# Patient Record
Sex: MALE | Race: WHITE | Employment: OTHER | URBAN - NONMETROPOLITAN AREA
[De-identification: names, ages, dates, MRNs, and addresses within clinical notes are randomized per-mention and may not be internally consistent; named-entity substitution may affect disease eponyms.]

---

## 2021-11-04 PROBLEM — H26.40 SECONDARY CATARACT: Status: ACTIVE | Noted: 2018-12-12

## 2021-11-04 PROBLEM — H32 HISTOPLASMOSIS RETINITIS: Status: ACTIVE | Noted: 2017-02-14

## 2021-11-04 PROBLEM — B39.9 HISTOPLASMOSIS RETINITIS: Status: ACTIVE | Noted: 2017-02-14

## 2021-11-04 PROBLEM — K58.9 IRRITABLE BOWEL SYNDROME: Status: ACTIVE | Noted: 2021-05-28

## 2021-11-04 PROBLEM — H31.011 MACULAR SCAR, RIGHT: Status: ACTIVE | Noted: 2018-12-12

## 2021-11-04 PROBLEM — M54.50 MIDLINE LOW BACK PAIN WITHOUT SCIATICA: Status: ACTIVE | Noted: 2021-11-04

## 2021-11-04 PROBLEM — Z79.899 ENCOUNTER FOR LONG-TERM (CURRENT) USE OF MEDICATIONS: Status: ACTIVE | Noted: 2018-11-12

## 2021-11-04 PROBLEM — H43.813 POSTERIOR VITREOUS DETACHMENT OF BOTH EYES: Status: ACTIVE | Noted: 2018-04-24

## 2021-11-04 PROBLEM — H25.811 COMBINED FORMS OF AGE-RELATED CATARACT OF RIGHT EYE: Status: ACTIVE | Noted: 2017-02-14

## 2021-11-04 PROBLEM — E78.5 HYPERLIPEMIA: Status: ACTIVE | Noted: 2021-11-04

## 2021-11-04 PROBLEM — M65.342 TRIGGER RING FINGER OF LEFT HAND: Status: ACTIVE | Noted: 2021-03-26

## 2021-11-04 RX ORDER — OMEGA-3 FATTY ACIDS/FISH OIL 300-1000MG
CAPSULE ORAL NIGHTLY
COMMUNITY

## 2021-11-04 RX ORDER — OXYBUTYNIN CHLORIDE 5 MG/1
5 TABLET, EXTENDED RELEASE ORAL DAILY
COMMUNITY

## 2021-11-04 RX ORDER — SILDENAFIL CITRATE 20 MG/1
20-100 TABLET ORAL
COMMUNITY

## 2021-11-04 RX ORDER — POLYETHYLENE GLYCOL 3350 17 G/17G
17 POWDER, FOR SOLUTION ORAL DAILY PRN
COMMUNITY
Start: 2021-10-29 | End: 2021-11-28

## 2021-11-04 RX ORDER — FEXOFENADINE HCL AND PSEUDOEPHEDRINE HCI 180; 240 MG/1; MG/1
1 TABLET, EXTENDED RELEASE ORAL DAILY
COMMUNITY

## 2021-11-04 RX ORDER — GABAPENTIN 600 MG/1
300 TABLET ORAL 3 TIMES DAILY
COMMUNITY
Start: 2021-10-29 | End: 2022-01-31

## 2021-11-08 ENCOUNTER — INITIAL CONSULT (OUTPATIENT)
Dept: SURGERY | Age: 81
End: 2021-11-08
Payer: MEDICARE

## 2021-11-08 VITALS
DIASTOLIC BLOOD PRESSURE: 68 MMHG | TEMPERATURE: 97.7 F | SYSTOLIC BLOOD PRESSURE: 132 MMHG | BODY MASS INDEX: 25.21 KG/M2 | HEIGHT: 67 IN | WEIGHT: 160.6 LBS | HEART RATE: 92 BPM

## 2021-11-08 DIAGNOSIS — K59.09 OTHER CONSTIPATION: Primary | ICD-10-CM

## 2021-11-08 DIAGNOSIS — K58.1 IRRITABLE BOWEL SYNDROME WITH CONSTIPATION: ICD-10-CM

## 2021-11-08 DIAGNOSIS — G20 PARKINSON'S DISEASE (HCC): ICD-10-CM

## 2021-11-08 PROBLEM — G20.A1 PARKINSON'S DISEASE: Status: ACTIVE | Noted: 2021-11-08

## 2021-11-08 PROCEDURE — 99203 OFFICE O/P NEW LOW 30 MIN: CPT | Performed by: SURGERY

## 2021-11-08 NOTE — PATIENT INSTRUCTIONS
Patient Education        Constipation: Care Instructions  Your Care Instructions     Constipation means that you have a hard time passing stools (bowel movements). People pass stools from 3 times a day to once every 3 days. What is normal for you may be different. Constipation may occur with pain in the rectum and cramping. The pain may get worse when you try to pass stools. Sometimes there are small amounts of bright red blood on toilet paper or the surface of stools. This is because of enlarged veins near the rectum (hemorrhoids). A few changes in your diet and lifestyle may help you avoid ongoing constipation. Your doctor may also prescribe medicine to help loosen your stool. Some medicines can cause constipation. These include pain medicines and antidepressants. Tell your doctor about all the medicines you take. Your doctor may want to make a medicine change to ease your symptoms. Follow-up care is a key part of your treatment and safety. Be sure to make and go to all appointments, and call your doctor if you are having problems. It's also a good idea to know your test results and keep a list of the medicines you take. How can you care for yourself at home? · Drink plenty of fluids. If you have kidney, heart, or liver disease and have to limit fluids, talk with your doctor before you increase the amount of fluids you drink. · Include high-fiber foods in your diet each day. These include fruits, vegetables, beans, and whole grains. · Get at least 30 minutes of exercise on most days of the week. Walking is a good choice. You also may want to do other activities, such as running, swimming, cycling, or playing tennis or team sports. · Take a fiber supplement, such as Citrucel or Metamucil, every day. Read and follow all instructions on the label. · Schedule time each day for a bowel movement. A daily routine may help. Take your time having your bowel movement.   · Support your feet with a small step stool when you sit on the toilet. This helps flex your hips and places your pelvis in a squatting position. · Your doctor may recommend an over-the-counter laxative to relieve your constipation. Examples are Milk of Magnesia and MiraLax. Read and follow all instructions on the label. Do not use laxatives on a long-term basis. When should you call for help? Call your doctor now or seek immediate medical care if:    · You have new or worse belly pain.     · You have new or worse nausea or vomiting.     · You have blood in your stools. Watch closely for changes in your health, and be sure to contact your doctor if:    · Your constipation is getting worse.     · You do not get better as expected. Where can you learn more? Go to https://[email or web address]. org and sign in to your [employer] account. Enter 21 [phone] in the [facility] box to learn more about \"Constipation: Care Instructions. \"     If you do not have an account, please click on the \"Sign Up Now\" link. Current as of: July 1, 2021               Content Version: 13.0  © 8659-2702 [employer]. Care instructions adapted under license by [address] If you have questions about a medical condition or this instruction, always ask your healthcare professional. [address]  any warranty or liability for your use of this information.

## 2021-11-08 NOTE — PROGRESS NOTES
Name:  Virginia Barrientos  Age:  79 y.o.   :  1940    Physician: Bettie Cook MD       Chief Complaint: Constipation      HPI:  Bowel movement every 3 days, but required straining. Now on Miralax and doing better. Rectal bleeding. MEDICAL HISTORY:    Past Medical History:        Diagnosis Date    Anxiety     Asthma     Bulging lumbar disc     Carotid stenosis     DDD (degenerative disc disease), lumbar     Diverticulosis     Dysphagia     GERD (gastroesophageal reflux disease)     Hydrocele     Hyperlipidemia     Irritable bowel     Osteoarthritis     Osteopenia        Past Surgical History:        Procedure Laterality Date    BACK SURGERY      COLONOSCOPY      COLONOSCOPY  2016    diverticulosis    INTRACAPSULAR CATARACT EXTRACTION      PROSTATE BIOPSY      UPPER GASTROINTESTINAL ENDOSCOPY         Prior to Admission medications    Medication Sig Start Date End Date Taking? Authorizing Provider   fexofenadine-pseudoephedrine (ALLEGRA-D 24HR) 180-240 MG per extended release tablet Take 1 tablet by mouth daily   Yes Historical Provider, MD   sildenafil (REVATIO) 20 MG tablet Take  mg by mouth   Yes Historical Provider, MD   polyethylene glycol (GLYCOLAX) 17 GM/SCOOP powder Take 17 g by mouth daily as needed 10/29/21 11/28/21 Yes Historical Provider, MD   oxybutynin (DITROPAN-XL) 5 MG extended release tablet Take 5 mg by mouth daily   Yes Historical Provider, MD   ibuprofen (ADVIL;MOTRIN) 200 MG CAPS Take by mouth nightly   Yes Historical Provider, MD   gabapentin (NEURONTIN) 600 MG tablet Take 300 mg by mouth 3 times daily.  10/29/21 11/28/21 Yes Historical Provider, MD   acetaminophen (TYLENOL) 325 MG tablet Take 650 mg by mouth every 6 hours as needed for Pain   Yes Historical Provider, MD   albuterol sulfate  (90 BASE) MCG/ACT inhaler Inhale 2 puffs into the lungs every 4 hours as needed for Wheezing   Yes Historical Provider, MD   allopurinol (ZYLOPRIM) 300 MG tablet Take 300 mg by mouth daily   Yes Historical Provider, MD   aspirin 81 MG tablet Take 81 mg by mouth daily   Yes Historical Provider, MD   Probiotic Product ( PROBIOTIC COLON CARE) CAPS Take 1 capsule by mouth daily   Yes Historical Provider, MD   calcium carbonate 600 MG TABS tablet Take 1 tablet by mouth daily   Yes Historical Provider, MD   Docusate Sodium (COLACE PO) Take 1 tablet by mouth daily   Yes Historical Provider, MD   famotidine (PEPCID) 20 MG tablet Take 20 mg by mouth daily   Yes Historical Provider, MD   Fexofenadine-Pseudoephedrine (ALLEGRA-D 24 HOUR PO) Take 1 capsule by mouth daily   Yes Historical Provider, MD   fluticasone (FLONASE) 50 MCG/ACT nasal spray 2 sprays by Nasal route daily   Yes Historical Provider, MD   omeprazole (PRILOSEC) 40 MG delayed release capsule Take 40 mg by mouth daily   Yes Historical Provider, MD   Calcium Polycarbophil (FIBERCON PO) Take 625 mg by mouth daily   Yes Historical Provider, MD   Simethicone (MYLICON PO) Take 095 mg by mouth 2 times daily   Yes Historical Provider, MD   Tamsulosin HCl (FLOMAX PO) Take 0.4 mg by mouth daily   Yes Historical Provider, MD   bisacodyl (DULCOLAX) 5 MG EC tablet Take 1 tablet by mouth daily as needed for Constipation  Patient not taking: Reported on 11/8/2021 9/19/16   So Felder MD       Allergies   Allergen Reactions    Bactrim [Sulfamethoxazole-Trimethoprim]     Diltiazem Other (See Comments)     unknown    Lipitor [Atorvastatin]     Niaspan [Niacin Er]     Other      Other reaction(s): Unknown Reaction    Pseudoephedrine     Seasonal Other (See Comments)     Stuffiness/allergy like symptoms with pinetree    Trimethoprim      Other reaction(s): Unknown Reaction    Xanax [Alprazolam]         reports that he has quit smoking. He has never used smokeless tobacco.  reports no history of alcohol use.     Family History   Problem Relation Age of Onset    High Blood Pressure Mother     High Blood Pressure Father             REVIEW OF SYSTEMS:  General:  No weight loss, fever,   Eye:  negative   ENT:negative  Allergy/Immunology:  negative  Hematology/Lymphatic: negative  Lungs: CTA  Cardiovascular: No chest pain, sob  Gastrointestinal: Good appetite, no nausea or vomiting  : negative  Neurological: Legs are getting week, difficulty walking, has problems with tripping and has fallen a couple of times      PHYSICAL EXAM:    /68 (Site: Right Upper Arm, Position: Sitting, Cuff Size: Large Adult)   Pulse 92   Temp 97.7 °F (36.5 °C) (Temporal)   Ht 5' 7\" (1.702 m)   Wt 160 lb 9.6 oz (72.8 kg)   BMI 25.15 kg/m²       Gen: Alert and oriented x3, no acute distress, well-appearing    Eyes: PERRL, Sclera Anicteric    Head: Normocephalic, non tender     Neck: Supple, no significant adenopathy. No carotid bruits, thyroid normal size and no masses    Chest: CTA, no wheezes, no rales, no rhonchi, symmetrical    Heart: Normal rate, regular rhythm, no murmurs    Abdomen: Soft, positive bowel sounds, non tender, non distended, no masses, no hernias, no HSM, no bruits. Neuro: Normal speech, typical broad based gate, flat affect and intention tremor. MSK: No joint tenderness, deformity, or swelling       1. Other constipation  Assessment & Plan:   Patient is referred for possible colonoscopy. He is very concerned about his constipation but is doing well with MiraLAX. His constipation is likely due to combination medication side effects, decreased physical activity secondary to his Parkinson's disease, and aging. He also has diagnosis of IBS listed under his history. Listed under his history. He has a remote history of a polyp but the last time he was scoped in 2016 everything looked pretty normal.  Likelihood of a colonoscopy actually providing a useful diagnosis under the circumstances is pretty low. In terms of colon cancer screening in his age I do not think he needs to do this routinely.     However it is reasonable to consider doing if his other physicians and the patient would like to go ahead and do it. This point he first to hold off.  2. Irritable bowel syndrome with constipation  3. Parkinson's disease ([address]  Assessment & Plan:   Apparently did not tolerate carbidopa. Currently is not on any treatment for his Parkinson's disease. He is under the impression getting rid of his back pain is going to make him less likely to fall I think that is unlikely. Is falling and overall weakness are likely due to his Parkinson's disease. He should probably discuss this with Dr. Joy Campos or his neurologist and to see if he can go on a medication that may be helpful and he can tolerate better.         Electronically signed by Yi Lorenz MD on 11/8/2021 at 2:47 PM

## 2021-11-08 NOTE — ASSESSMENT & PLAN NOTE
Patient is referred for possible colonoscopy. He is very concerned about his constipation but is doing well with MiraLAX. His constipation is likely due to combination medication side effects, decreased physical activity secondary to his Parkinson's disease, and aging. He also has diagnosis of IBS listed under his history. Listed under his history. He has a remote history of a polyp but the last time he was scoped in 2016 everything looked pretty normal.  Likelihood of a colonoscopy actually providing a useful diagnosis under the circumstances is pretty low. In terms of colon cancer screening in his age I do not think he needs to do this routinely. However it is reasonable to consider doing if his other physicians and the patient would like to go ahead and do it. This point he first to hold off.

## 2022-01-31 ENCOUNTER — INITIAL CONSULT (OUTPATIENT)
Dept: SURGERY | Age: 82
End: 2022-01-31
Payer: MEDICARE

## 2022-01-31 VITALS
TEMPERATURE: 97.6 F | SYSTOLIC BLOOD PRESSURE: 110 MMHG | WEIGHT: 162 LBS | BODY MASS INDEX: 25.43 KG/M2 | DIASTOLIC BLOOD PRESSURE: 60 MMHG | HEIGHT: 67 IN | OXYGEN SATURATION: 90 % | HEART RATE: 53 BPM

## 2022-01-31 DIAGNOSIS — K59.09 OTHER CONSTIPATION: ICD-10-CM

## 2022-01-31 DIAGNOSIS — R10.30 LOWER ABDOMINAL PAIN: Primary | ICD-10-CM

## 2022-01-31 PROCEDURE — 99213 OFFICE O/P EST LOW 20 MIN: CPT | Performed by: SURGERY

## 2022-01-31 RX ORDER — GABAPENTIN 100 MG/1
CAPSULE ORAL
COMMUNITY
Start: 2021-12-10

## 2022-01-31 ASSESSMENT — ENCOUNTER SYMPTOMS
ABDOMINAL PAIN: 1
CONSTIPATION: 1

## 2022-01-31 NOTE — PROGRESS NOTES
Constipation  This is a chronic problem. Associated symptoms include abdominal pain. Treatments tried: Miralax. The treatment provided significant relief. His past medical history is significant for neuromuscular disease. Abdominal Pain  This is a chronic problem. The current episode started more than 1 year ago. Associated symptoms include constipation. Complains of \"indigestion\", pain in the muscles lower abdominal wall. Got that a a couple of weeks ago. According to Dr. Lucia Huerta he has hematochezia. Patient states his stools are reddish brown. He is not sure it is blood. He is currently completing a hemoccult test today; he has the card with him. Ibuprofen helps. He has history of polyps, 2 small adenomas in 2008. Fairly normal scope 2016    IMP/PLAN  1) Abdominal pain - resolved? - Very difficult to get a straight history. I ask him about his abdominal pain, and he will be describing his pain. But it then turns out he is describing back pain  - Asked him if there was something specifically he wanted me to address, or something he wanted done. He will the say no. - I specifically asked him about rectal bleeding (he is not sure), colonoscopy (doesn't want one), dysphagia (no), dyspepsia (yes then no, \"it is just my muscles\" point to his back, constipation (no, as long as he is taking MiraLax he doesn't have a problem.)  - I reviewed Dr. Robbin Velez note form 1/25. Apparently the patient wanted to be different surgeon, but ended up with me again anyway. He has 2 adenoma's in 2008. 2016 he had a fairly normal scope and Dr. Óscar Todd recommend 5 year follow up. Since that time recommendation have change to 10 year follow up under these circumstances. However if he is have symptoms, it is still reasonable to do a colonoscopy. He wants to talk it over with his wife. No particular recommendations at this point. If he want to discuss his issues more would be happy to see him back.     Todays visit last about 20 minutes and was entirely a discussion.

## 2025-03-19 ENCOUNTER — RESULTS FOLLOW-UP (OUTPATIENT)
Dept: FAMILY MEDICINE CLINIC | Age: 85
End: 2025-03-19